# Patient Record
Sex: FEMALE | Race: ASIAN | Employment: UNEMPLOYED | URBAN - METROPOLITAN AREA
[De-identification: names, ages, dates, MRNs, and addresses within clinical notes are randomized per-mention and may not be internally consistent; named-entity substitution may affect disease eponyms.]

---

## 2018-10-10 ENCOUNTER — OFFICE VISIT (OUTPATIENT)
Dept: OBGYN CLINIC | Facility: CLINIC | Age: 28
End: 2018-10-10
Payer: COMMERCIAL

## 2018-10-10 VITALS
DIASTOLIC BLOOD PRESSURE: 63 MMHG | HEART RATE: 73 BPM | HEIGHT: 65.5 IN | BODY MASS INDEX: 22.94 KG/M2 | WEIGHT: 139.38 LBS | SYSTOLIC BLOOD PRESSURE: 99 MMHG

## 2018-10-10 DIAGNOSIS — Z30.011 BCP (BIRTH CONTROL PILLS) INITIATION: ICD-10-CM

## 2018-10-10 DIAGNOSIS — Z01.419 ENCOUNTER FOR GYNECOLOGICAL EXAMINATION WITHOUT ABNORMAL FINDING: Primary | ICD-10-CM

## 2018-10-10 DIAGNOSIS — Z12.4 CERVICAL CANCER SCREENING: ICD-10-CM

## 2018-10-10 PROCEDURE — 99385 PREV VISIT NEW AGE 18-39: CPT | Performed by: OBSTETRICS & GYNECOLOGY

## 2018-10-10 RX ORDER — NORETHINDRONE ACETATE AND ETHINYL ESTRADIOL 1MG-20(21)
1 KIT ORAL DAILY
Qty: 1 PACKAGE | Refills: 12 | Status: SHIPPED | OUTPATIENT
Start: 2018-10-10 | End: 2019-10-10

## 2018-10-10 NOTE — PROGRESS NOTES
Well Woman Exam    HPI:  The patient is a 25 year female who presents for annual exam. 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 Pt denies complaints. She would like contraception. She would like OCPs. Denies migraines, VTE, HTN. vaginal discharge, vaginal itching  Musculoskeletal:  denies back pain. Skin/Breast:  Denies any breast pain, lumps, or discharge. Neurological:  denies headaches, extremity weakness  Psychiatric: denies depression or anxiety.        10/10/18  4950   BP: 1. Reviewed current guidelines with recommendation to start mammograms at age 36; however, ACOG encourages shared decision making with the patient and together we reach appropriate screening intervals for the individual  2.  Reviewed breast self awareness

## 2018-10-24 ENCOUNTER — TELEPHONE (OUTPATIENT)
Dept: OBGYN CLINIC | Facility: CLINIC | Age: 28
End: 2018-10-24

## 2018-10-24 DIAGNOSIS — Z32.00 PREGNANCY EXAMINATION OR TEST, PREGNANCY UNCONFIRMED: Primary | ICD-10-CM

## 2018-10-24 NOTE — TELEPHONE ENCOUNTER
----- Message from Adina Aguilar MD sent at 10/23/2018  1:34 PM CDT -----  Please let patient know that her pap smear was abnormal. She will need a colpo. She will need UPT on day of colpo if she is taking OCPs.  If she is not on OCPs then she needs seru

## 2018-11-09 NOTE — TELEPHONE ENCOUNTER
Informed pt of pap smear results and KCB recs below. Pt states she is not on OCP. Pt aware she needs serum HCG done prior to appt. Assisted pt with scheduling first available appt on 11/19/18 at 10am at [facility] with [address] Lab hours provided to pt.  Pt verbalized

## 2018-11-18 ENCOUNTER — APPOINTMENT (OUTPATIENT)
Dept: LAB | Facility: HOSPITAL | Age: 28
End: 2018-11-18
Attending: OBSTETRICS & GYNECOLOGY
Payer: COMMERCIAL

## 2018-11-18 DIAGNOSIS — Z32.00 PREGNANCY EXAMINATION OR TEST, PREGNANCY UNCONFIRMED: ICD-10-CM

## 2018-11-18 PROCEDURE — 36415 COLL VENOUS BLD VENIPUNCTURE: CPT

## 2018-11-18 PROCEDURE — 84703 CHORIONIC GONADOTROPIN ASSAY: CPT

## 2018-11-19 ENCOUNTER — OFFICE VISIT (OUTPATIENT)
Dept: OBGYN CLINIC | Facility: CLINIC | Age: 28
End: 2018-11-19
Payer: COMMERCIAL

## 2018-11-19 VITALS
HEART RATE: 93 BPM | SYSTOLIC BLOOD PRESSURE: 110 MMHG | DIASTOLIC BLOOD PRESSURE: 73 MMHG | WEIGHT: 142 LBS | BODY MASS INDEX: 23 KG/M2

## 2018-11-19 DIAGNOSIS — R87.612 PAPANICOLAOU SMEAR OF CERVIX WITH LOW GRADE SQUAMOUS INTRAEPITHELIAL LESION (LGSIL): Primary | ICD-10-CM

## 2018-11-19 PROCEDURE — 57454 BX/CURETT OF CERVIX W/SCOPE: CPT | Performed by: OBSTETRICS & GYNECOLOGY

## 2018-11-19 NOTE — PROCEDURES
Colpo w/Cx Biopsy and ECC    Pregnancy Results: negative from blood test   Birth control method(s) used: OCPS    Consent signed. Procedure discussed with patient in detail including indication, risk, benefits, alternatives and complications.     Shasha Chen

## 2018-11-20 ENCOUNTER — TELEPHONE (OUTPATIENT)
Dept: OBGYN CLINIC | Facility: CLINIC | Age: 28
End: 2018-11-20

## 2020-05-11 ENCOUNTER — EMPLOYEE HEALTH (OUTPATIENT)
Dept: OTHER | Age: 30
End: 2020-05-11

## 2020-05-11 DIAGNOSIS — Z20.822 SUSPECTED COVID-19 VIRUS INFECTION: Primary | ICD-10-CM

## 2020-05-12 ENCOUNTER — LAB SERVICES (OUTPATIENT)
Dept: LAB | Age: 30
End: 2020-05-12

## 2020-05-12 DIAGNOSIS — Z20.822 SUSPECTED COVID-19 VIRUS INFECTION: ICD-10-CM

## 2020-05-12 LAB
SARS-COV-2 RNA SPEC QL NAA+PROBE: NOT DETECTED
SERVICE CMNT-IMP: NORMAL
SPECIMEN SOURCE: NORMAL

## 2020-05-12 PROCEDURE — 87635 SARS-COV-2 COVID-19 AMP PRB: CPT | Performed by: HOSPITALIST

## 2020-05-13 ENCOUNTER — EMPLOYEE HEALTH (OUTPATIENT)
Dept: OTHER | Age: 30
End: 2020-05-13

## 2020-05-15 ENCOUNTER — EMPLOYEE HEALTH (OUTPATIENT)
Dept: OTHER | Age: 30
End: 2020-05-15

## 2020-05-16 ENCOUNTER — EMPLOYEE HEALTH (OUTPATIENT)
Dept: OTHER | Age: 30
End: 2020-05-16

## 2020-05-19 ENCOUNTER — EMPLOYEE HEALTH (OUTPATIENT)
Dept: OTHER | Age: 30
End: 2020-05-19

## 2020-05-21 ENCOUNTER — EMPLOYEE HEALTH (OUTPATIENT)
Dept: OTHER | Age: 30
End: 2020-05-21

## 2020-05-22 ENCOUNTER — EMPLOYEE HEALTH (OUTPATIENT)
Dept: OTHER | Age: 30
End: 2020-05-22

## 2020-05-23 ENCOUNTER — EMPLOYEE HEALTH (OUTPATIENT)
Dept: OTHER | Age: 30
End: 2020-05-23

## 2020-05-27 ENCOUNTER — EMPLOYEE HEALTH (OUTPATIENT)
Dept: OTHER | Age: 30
End: 2020-05-27

## 2020-05-28 ENCOUNTER — EMPLOYEE HEALTH (OUTPATIENT)
Dept: OTHER | Age: 30
End: 2020-05-28

## 2020-09-11 ENCOUNTER — EMPLOYEE HEALTH (OUTPATIENT)
Dept: OTHER | Age: 30
End: 2020-09-11

## 2020-09-11 ENCOUNTER — LAB SERVICES (OUTPATIENT)
Dept: LAB | Age: 30
End: 2020-09-11

## 2020-09-11 DIAGNOSIS — Z20.822 SUSPECTED COVID-19 VIRUS INFECTION: Primary | ICD-10-CM

## 2020-09-11 DIAGNOSIS — Z20.822 SUSPECTED COVID-19 VIRUS INFECTION: ICD-10-CM

## 2020-09-11 PROCEDURE — 87635 SARS-COV-2 COVID-19 AMP PRB: CPT | Performed by: HOSPITALIST

## 2020-09-12 ENCOUNTER — EMPLOYEE HEALTH (OUTPATIENT)
Dept: OTHER | Age: 30
End: 2020-09-12

## 2020-09-12 LAB
SARS-COV-2 RNA RESP QL NAA+PROBE: NOT DETECTED
SERVICE CMNT-IMP: NORMAL
SPECIMEN SOURCE: NORMAL

## 2020-12-10 ENCOUNTER — APPOINTMENT (OUTPATIENT)
Dept: INTERNAL MEDICINE | Age: 30
End: 2020-12-10

## 2020-12-10 RX ORDER — CEPHALEXIN 500 MG/1
CAPSULE ORAL
COMMUNITY
Start: 2020-11-09

## 2020-12-10 RX ORDER — BUPROPION HYDROCHLORIDE 150 MG/1
TABLET ORAL
COMMUNITY
Start: 2020-11-09

## 2020-12-10 RX ORDER — CLOBETASOL PROPIONATE 0.5 MG/G
OINTMENT TOPICAL
COMMUNITY
Start: 2020-09-11

## 2020-12-10 RX ORDER — NORGESTIMATE AND ETHINYL ESTRADIOL 7DAYSX3 28
KIT ORAL
COMMUNITY

## 2020-12-10 RX ORDER — CLONAZEPAM 0.5 MG/1
TABLET ORAL
COMMUNITY
Start: 2020-10-15

## 2020-12-10 RX ORDER — SERTRALINE HYDROCHLORIDE 100 MG/1
TABLET, FILM COATED ORAL
COMMUNITY
Start: 2020-10-05

## 2020-12-10 RX ORDER — TRAMADOL HYDROCHLORIDE 50 MG/1
TABLET ORAL
Refills: 0 | COMMUNITY
Start: 2020-11-13

## 2021-02-12 ENCOUNTER — IMMUNIZATION (OUTPATIENT)
Dept: LAB | Age: 31
End: 2021-02-12

## 2021-02-12 DIAGNOSIS — Z23 NEED FOR VACCINATION: Primary | ICD-10-CM

## 2021-02-12 PROCEDURE — 0011A COVID-19 MODERNA VACCINE: CPT

## 2021-02-12 PROCEDURE — 91301 COVID-19 MODERNA VACCINE: CPT

## 2021-03-17 ENCOUNTER — IMMUNIZATION (OUTPATIENT)
Dept: LAB | Age: 31
End: 2021-03-17
Attending: HOSPITALIST

## 2021-03-17 DIAGNOSIS — Z23 NEED FOR VACCINATION: Primary | ICD-10-CM

## 2021-03-17 PROCEDURE — 91301 COVID-19 MODERNA VACCINE: CPT

## 2021-03-17 PROCEDURE — 0012A COVID-19 MODERNA VACCINE: CPT

## 2021-05-14 NOTE — TELEPHONE ENCOUNTER
----- Message from Austen Lino MD sent at 11/20/2018  2:26 PM CST -----  Please let patient know that her biopsy showed subtle changes-KOSTA I.  She will need repeat pap smear in one year normal...

## 2021-11-07 ENCOUNTER — HOSPITAL ENCOUNTER (OUTPATIENT)
Age: 31
Discharge: HOME | End: 2021-11-07
Payer: COMMERCIAL

## 2021-11-07 ENCOUNTER — APPOINTMENT (OUTPATIENT)
Dept: GENERAL RADIOLOGY | Age: 31
End: 2021-11-07
Attending: PHYSICIAN ASSISTANT
Payer: COMMERCIAL

## 2021-11-07 VITALS
RESPIRATION RATE: 18 BRPM | HEIGHT: 64 IN | OXYGEN SATURATION: 97 % | TEMPERATURE: 98 F | HEART RATE: 88 BPM | WEIGHT: 140 LBS | SYSTOLIC BLOOD PRESSURE: 99 MMHG | BODY MASS INDEX: 23.9 KG/M2 | DIASTOLIC BLOOD PRESSURE: 84 MMHG

## 2021-11-07 DIAGNOSIS — R05.9 COUGH: ICD-10-CM

## 2021-11-07 DIAGNOSIS — H66.93 BILATERAL OTITIS MEDIA, UNSPECIFIED OTITIS MEDIA TYPE: Primary | ICD-10-CM

## 2021-11-07 PROCEDURE — 94640 AIRWAY INHALATION TREATMENT: CPT

## 2021-11-07 PROCEDURE — 99204 OFFICE O/P NEW MOD 45 MIN: CPT

## 2021-11-07 PROCEDURE — 99203 OFFICE O/P NEW LOW 30 MIN: CPT

## 2021-11-07 PROCEDURE — 71046 X-RAY EXAM CHEST 2 VIEWS: CPT | Performed by: PHYSICIAN ASSISTANT

## 2021-11-07 RX ORDER — PREDNISONE 20 MG/1
40 TABLET ORAL DAILY
Qty: 10 TABLET | Refills: 0 | Status: SHIPPED | OUTPATIENT
Start: 2021-11-07 | End: 2021-11-12

## 2021-11-07 RX ORDER — AMOXICILLIN 875 MG/1
875 TABLET, COATED ORAL 2 TIMES DAILY
Qty: 14 TABLET | Refills: 0 | Status: SHIPPED | OUTPATIENT
Start: 2021-11-07 | End: 2021-11-14

## 2021-11-07 RX ORDER — OMEGA-3 FATTY ACIDS/FISH OIL 300-1000MG
CAPSULE ORAL
COMMUNITY

## 2021-11-07 RX ORDER — BENZONATATE 100 MG/1
100 CAPSULE ORAL 3 TIMES DAILY PRN
Qty: 30 CAPSULE | Refills: 0 | Status: SHIPPED | OUTPATIENT
Start: 2021-11-07 | End: 2021-12-07

## 2021-11-07 RX ORDER — ALBUTEROL SULFATE 90 UG/1
2 AEROSOL, METERED RESPIRATORY (INHALATION) ONCE
Status: COMPLETED | OUTPATIENT
Start: 2021-11-07 | End: 2021-11-07

## 2021-11-07 NOTE — ED PROVIDER NOTES
Patient Seen in: [facility]      History   Patient presents with:  Cough/URI    Stated Complaint: COUGH    Subjective:   HPI    27-year-old female presents to the IC for evaluation of cough and congestion symptoms for 1 week.   Cough is dr Breath sounds: Normal breath sounds. Comments: Persistent cough noted  Musculoskeletal:      Cervical back: Normal range of motion and neck supple. Skin:     General: Skin is warm and dry.       Capillary Refill: Capillary refill takes less than Disposition:  Discharge  11/7/2021  3:57 pm    Follow-up:  [facility] [place] [id number]  [phone]              Medications Prescribed:  Discharge Medication List as of 11/7/2021  3:59 PM    START taking the

## 2022-02-16 ENCOUNTER — IMMUNIZATION (OUTPATIENT)
Dept: LAB | Age: 32
End: 2022-02-16

## 2022-02-16 DIAGNOSIS — Z23 NEED FOR VACCINATION: Primary | ICD-10-CM

## 2022-02-16 PROCEDURE — 91305 COVID 19 12Y AND OLDER PFIZER-BIONTECH - DO NOT DILUTE: CPT | Performed by: INTERNAL MEDICINE

## 2022-02-16 PROCEDURE — 0051A COVID 19 12Y AND OLDER PFIZER-BIONTECH - DO NOT DILUTE: CPT | Performed by: INTERNAL MEDICINE

## 2023-03-20 ENCOUNTER — TELEPHONE (OUTPATIENT)
Dept: INTERNAL MEDICINE CLINIC | Facility: CLINIC | Age: 33
End: 2023-03-20

## 2023-03-20 NOTE — TELEPHONE ENCOUNTER
Pt scheduled myc appt for 3/27     Re: gynecologic care, rectal blood in stool, primary care    Attempted to reach pt to see if able to come in sooner, but mailbox full.     Sent myc msg to pt

## 2023-03-23 NOTE — TELEPHONE ENCOUNTER
JOSE and advised her appt can be given sooner this week due to her c/o blood in stool  4 contact attempt

## 2023-03-27 ENCOUNTER — OFFICE VISIT (OUTPATIENT)
Dept: INTERNAL MEDICINE CLINIC | Facility: CLINIC | Age: 33
End: 2023-03-27
Payer: COMMERCIAL

## 2023-03-27 ENCOUNTER — TELEPHONE (OUTPATIENT)
Dept: INTERNAL MEDICINE CLINIC | Facility: CLINIC | Age: 33
End: 2023-03-27

## 2023-03-27 VITALS
RESPIRATION RATE: 18 BRPM | DIASTOLIC BLOOD PRESSURE: 70 MMHG | SYSTOLIC BLOOD PRESSURE: 116 MMHG | TEMPERATURE: 98 F | BODY MASS INDEX: 23.54 KG/M2 | OXYGEN SATURATION: 99 % | HEIGHT: 65.35 IN | HEART RATE: 80 BPM | WEIGHT: 143 LBS

## 2023-03-27 DIAGNOSIS — E55.9 VITAMIN D DEFICIENCY: ICD-10-CM

## 2023-03-27 DIAGNOSIS — K62.5 RECTAL BLEEDING: ICD-10-CM

## 2023-03-27 DIAGNOSIS — L65.9 HAIR THINNING: ICD-10-CM

## 2023-03-27 DIAGNOSIS — Z00.00 ROUTINE PHYSICAL EXAMINATION: Primary | ICD-10-CM

## 2023-03-27 PROBLEM — F32.A ANXIETY AND DEPRESSION: Status: ACTIVE | Noted: 2023-03-27

## 2023-03-27 PROBLEM — F41.9 ANXIETY AND DEPRESSION: Status: ACTIVE | Noted: 2023-03-27

## 2023-03-27 PROBLEM — F90.2 ATTENTION DEFICIT HYPERACTIVITY DISORDER (ADHD), COMBINED TYPE: Status: ACTIVE | Noted: 2023-03-27

## 2023-03-27 PROCEDURE — 90471 IMMUNIZATION ADMIN: CPT | Performed by: PHYSICIAN ASSISTANT

## 2023-03-27 PROCEDURE — 99385 PREV VISIT NEW AGE 18-39: CPT | Performed by: PHYSICIAN ASSISTANT

## 2023-03-27 PROCEDURE — 3008F BODY MASS INDEX DOCD: CPT | Performed by: PHYSICIAN ASSISTANT

## 2023-03-27 PROCEDURE — 90715 TDAP VACCINE 7 YRS/> IM: CPT | Performed by: PHYSICIAN ASSISTANT

## 2023-03-27 PROCEDURE — 3078F DIAST BP <80 MM HG: CPT | Performed by: PHYSICIAN ASSISTANT

## 2023-03-27 PROCEDURE — 3074F SYST BP LT 130 MM HG: CPT | Performed by: PHYSICIAN ASSISTANT

## 2023-03-27 RX ORDER — DULOXETIN HYDROCHLORIDE 60 MG/1
60 CAPSULE, DELAYED RELEASE ORAL DAILY
COMMUNITY
Start: 2023-01-16

## 2023-03-27 RX ORDER — ATOMOXETINE 40 MG/1
40 CAPSULE ORAL DAILY
COMMUNITY
Start: 2023-03-09

## 2023-03-27 RX ORDER — CLONIDINE HYDROCHLORIDE 0.1 MG/1
0.1 TABLET ORAL DAILY PRN
COMMUNITY
Start: 2023-03-09

## 2023-03-27 RX ORDER — BUSPIRONE HYDROCHLORIDE 10 MG/1
10 TABLET ORAL 2 TIMES DAILY
COMMUNITY
Start: 2023-03-09

## 2023-03-27 NOTE — TELEPHONE ENCOUNTER
Requesting dermatology referral information be sent to patient via [address] [employer] message sent with referral information.

## 2023-03-27 NOTE — PATIENT INSTRUCTIONS
Try fiber supplement 1 scoop daily for prevention of hemorrhoids and constipation.  Continue colace if needed

## 2023-04-20 ENCOUNTER — OFFICE VISIT (OUTPATIENT)
Dept: INTERNAL MEDICINE CLINIC | Facility: CLINIC | Age: 33
End: 2023-04-20
Payer: COMMERCIAL

## 2023-04-20 VITALS
RESPIRATION RATE: 18 BRPM | OXYGEN SATURATION: 99 % | SYSTOLIC BLOOD PRESSURE: 124 MMHG | WEIGHT: 138 LBS | HEART RATE: 90 BPM | DIASTOLIC BLOOD PRESSURE: 82 MMHG | TEMPERATURE: 99 F | BODY MASS INDEX: 23 KG/M2

## 2023-04-20 DIAGNOSIS — K64.4 EXTERNAL HEMORRHOID, BLEEDING: Primary | ICD-10-CM

## 2023-04-20 DIAGNOSIS — K59.03 DRUG-INDUCED CONSTIPATION: ICD-10-CM

## 2023-04-20 PROCEDURE — 99214 OFFICE O/P EST MOD 30 MIN: CPT | Performed by: NURSE PRACTITIONER

## 2023-04-20 PROCEDURE — 3079F DIAST BP 80-89 MM HG: CPT | Performed by: NURSE PRACTITIONER

## 2023-04-20 PROCEDURE — 3074F SYST BP LT 130 MM HG: CPT | Performed by: NURSE PRACTITIONER

## 2023-04-20 RX ORDER — PRAMOXINE HYDROCHLORIDE 10 MG/G
1 AEROSOL, FOAM TOPICAL 2 TIMES DAILY PRN
Qty: 1 EACH | Refills: 0 | Status: SHIPPED | OUTPATIENT
Start: 2023-04-20

## 2023-07-31 ENCOUNTER — HOSPITAL ENCOUNTER (OUTPATIENT)
Age: 33
Discharge: HOME | End: 2023-07-31
Payer: COMMERCIAL

## 2023-07-31 VITALS
OXYGEN SATURATION: 100 % | DIASTOLIC BLOOD PRESSURE: 87 MMHG | WEIGHT: 140 LBS | SYSTOLIC BLOOD PRESSURE: 122 MMHG | TEMPERATURE: 98 F | HEART RATE: 100 BPM | BODY MASS INDEX: 23.9 KG/M2 | RESPIRATION RATE: 18 BRPM | HEIGHT: 64 IN

## 2023-07-31 DIAGNOSIS — R63.8 INADEQUATE ORAL INTAKE: ICD-10-CM

## 2023-07-31 DIAGNOSIS — B34.9 VIRAL ILLNESS: Primary | ICD-10-CM

## 2023-07-31 LAB
ATRIAL RATE: 97 BPM
P AXIS: 60 DEGREES
P-R INTERVAL: 134 MS
Q-T INTERVAL: 350 MS
QRS DURATION: 76 MS
QTC CALCULATION (BEZET): 444 MS
R AXIS: 70 DEGREES
S PYO AG THROAT QL IA.RAPID: NEGATIVE
SARS-COV-2 RNA RESP QL NAA+PROBE: NOT DETECTED
T AXIS: 52 DEGREES
VENTRICULAR RATE: 97 BPM

## 2023-07-31 PROCEDURE — 87651 STREP A DNA AMP PROBE: CPT | Performed by: NURSE PRACTITIONER

## 2023-07-31 PROCEDURE — 93005 ELECTROCARDIOGRAM TRACING: CPT

## 2023-07-31 RX ORDER — ONDANSETRON 4 MG/1
4 TABLET, ORALLY DISINTEGRATING ORAL EVERY 4 HOURS PRN
Qty: 30 TABLET | Refills: 0 | Status: SHIPPED | OUTPATIENT
Start: 2023-07-31

## 2023-07-31 NOTE — DISCHARGE INSTRUCTIONS
Zofran as needed for nausea  Wash hands often  Disinfect your environment, linens, electronics, etc.  Drink plenty of fluids (water, Pedialyte, etc.)  Get plenty of rest and sleep with head elevated to help with sinus drainage and throat irritation  Avoid having air blow on your face as this can worsen congestion  Do not share utensils or drinks  Salt water gargles throughout the day for sore throat  Cepacol lozenges as needed for sore throat  Alternate Ibuprofen (adult: 600mg) and Tylenol (adult: 650-1000mg) as needed for pain / body aches / fever  You may benefit from spoonfuls of honey throughout the day for cough  You may benefit from taking a decongestant (e.g. Sudafed - pseudoephedrine [behind the pharmacy counter])  You may benefit from using a humidifier and/or steam showers  You may benefit from Flonase nasal spray daily  You may benefit from taking a daily allergy medication (e.g. Zyrtec, Xyzal, etc.)  Symptoms may take a few weeks to resolve

## 2023-07-31 NOTE — ED INITIAL ASSESSMENT (HPI)
Pt here c/o sore throat for last 5 days, cough, and feeling lightheaded. States her watch notified her this am her HR was 150 and noticed having palpitations. Also around 0730 today felt warm, and diaphoretic. Denies vomiting. Has been nauseated.

## 2023-09-18 ENCOUNTER — HOSPITAL ENCOUNTER (OUTPATIENT)
Dept: GENERAL RADIOLOGY | Age: 33
Discharge: HOME | End: 2023-09-18
Attending: STUDENT IN AN ORGANIZED HEALTH CARE EDUCATION/TRAINING PROGRAM
Payer: COMMERCIAL

## 2023-09-18 ENCOUNTER — OFFICE VISIT (OUTPATIENT)
Dept: FAMILY MEDICINE CLINIC | Facility: CLINIC | Age: 33
End: 2023-09-18
Payer: COMMERCIAL

## 2023-09-18 VITALS
BODY MASS INDEX: 23.49 KG/M2 | TEMPERATURE: 99 F | DIASTOLIC BLOOD PRESSURE: 68 MMHG | OXYGEN SATURATION: 98 % | HEIGHT: 65 IN | HEART RATE: 95 BPM | WEIGHT: 141 LBS | SYSTOLIC BLOOD PRESSURE: 110 MMHG

## 2023-09-18 DIAGNOSIS — R05.3 PERSISTENT COUGH: Primary | ICD-10-CM

## 2023-09-18 DIAGNOSIS — R05.3 PERSISTENT COUGH: ICD-10-CM

## 2023-09-18 PROCEDURE — 99203 OFFICE O/P NEW LOW 30 MIN: CPT | Performed by: STUDENT IN AN ORGANIZED HEALTH CARE EDUCATION/TRAINING PROGRAM

## 2023-09-18 PROCEDURE — 3074F SYST BP LT 130 MM HG: CPT | Performed by: STUDENT IN AN ORGANIZED HEALTH CARE EDUCATION/TRAINING PROGRAM

## 2023-09-18 PROCEDURE — 3008F BODY MASS INDEX DOCD: CPT | Performed by: STUDENT IN AN ORGANIZED HEALTH CARE EDUCATION/TRAINING PROGRAM

## 2023-09-18 PROCEDURE — 3078F DIAST BP <80 MM HG: CPT | Performed by: STUDENT IN AN ORGANIZED HEALTH CARE EDUCATION/TRAINING PROGRAM

## 2023-09-18 PROCEDURE — 71046 X-RAY EXAM CHEST 2 VIEWS: CPT | Performed by: STUDENT IN AN ORGANIZED HEALTH CARE EDUCATION/TRAINING PROGRAM

## 2023-09-18 RX ORDER — ALBUTEROL SULFATE 90 UG/1
2 AEROSOL, METERED RESPIRATORY (INHALATION) EVERY 6 HOURS PRN
Qty: 18 G | Refills: 0 | Status: SHIPPED | OUTPATIENT
Start: 2023-09-18

## (undated) NOTE — LETTER
AUTHORIZATION FOR SURGICAL OPERATION OR OTHER PROCEDURE    1.  I hereby authorize Dr. Garrison Keller, and [facility] assigned to my case to perform the following operation and/or procedure at the [facility]:    __________________________ Patient signature:  ___________________________________________________             Relationship to Patient:           []  Parent    Responsible person                          []  Spouse  In case of minor or                    [] Other  _____________   In

## (undated) NOTE — LETTER
Date & Time: 11/7/2021, 3:59 PM  Patient: Beth Fitzgerald  Encounter Provider(s):    CONCEPCIÓN Sands       To Whom It May Concern:    Beth Fitzgerald was seen and treated in our department on 11/7/2021.  Please excuse her from work on 11/8/2021, she may return

## (undated) NOTE — LETTER
10/31/2018              Ana Romero        [id number] [facility] [address] [facility]         Dear Beth Kemp,    This letter is to inform you that our office has made several attempts to reach you by phone without success.   We were attempting

## (undated) NOTE — LETTER
Date & Time: 7/31/2023, 9:15 AM  Patient: Philippe Workman  Encounter Provider(s):    AJ Casey       To Whom It May Concern:    Philippe Workman was seen and treated in our department on 7/31/2023. She can return to work on 8/2/2023. If you have any questions or concerns, please do not hesitate to call.        _____________________________  Crispin Newman.  VINCENT Christina, APRN, AGACNP-BC, FNP-C, CNL  Adult-Gerontology Acute Care & Family Nurse Practitioner